# Patient Record
Sex: FEMALE | URBAN - METROPOLITAN AREA
[De-identification: names, ages, dates, MRNs, and addresses within clinical notes are randomized per-mention and may not be internally consistent; named-entity substitution may affect disease eponyms.]

---

## 2022-05-05 ENCOUNTER — OFFICE VISIT (OUTPATIENT)
Dept: URBAN - METROPOLITAN AREA CLINIC 44 | Facility: CLINIC | Age: 82
End: 2022-05-05
Payer: COMMERCIAL

## 2022-05-05 DIAGNOSIS — H40.1122 PRIMARY OPEN-ANGLE GLAUCOMA, MODERATE STAGE, LEFT EYE: Primary | ICD-10-CM

## 2022-05-05 DIAGNOSIS — H25.11 AGE-RELATED NUCLEAR CATARACT, RIGHT EYE: ICD-10-CM

## 2022-05-05 DIAGNOSIS — H17.12 CENTRAL CORNEAL OPACITY OF LEFT EYE: ICD-10-CM

## 2022-05-05 DIAGNOSIS — H53.002 UNSPECIFIED AMBLYOPIA, LEFT EYE: ICD-10-CM

## 2022-05-05 PROCEDURE — 92133 CPTRZD OPH DX IMG PST SGM ON: CPT | Performed by: OPTOMETRIST

## 2022-05-05 PROCEDURE — 99204 OFFICE O/P NEW MOD 45 MIN: CPT | Performed by: OPTOMETRIST

## 2022-05-05 PROCEDURE — 76514 ECHO EXAM OF EYE THICKNESS: CPT | Performed by: OPTOMETRIST

## 2022-05-05 PROCEDURE — 92134 CPTRZ OPH DX IMG PST SGM RTA: CPT | Performed by: OPTOMETRIST

## 2022-05-05 RX ORDER — LATANOPROST 50 UG/ML
0.005 % SOLUTION OPHTHALMIC
Qty: 5 | Refills: 4 | Status: ACTIVE
Start: 2022-05-05

## 2022-05-05 ASSESSMENT — KERATOMETRY
OD: 44.25
OS: 44.75

## 2022-05-05 ASSESSMENT — INTRAOCULAR PRESSURE
OD: 21
OS: 22

## 2022-05-05 ASSESSMENT — VISUAL ACUITY
OD: 20/30
OS: 20/60

## 2022-05-05 NOTE — IMPRESSION/PLAN
Impression: Primary open-angle glaucoma, moderate stage, left eye: H40.1122. Plan: Oct thinning, elevated iop, thick pachs -3mm 2nd to cornea edema.  Start [employer],

## 2022-05-05 NOTE — IMPRESSION/PLAN
Impression: Age-related nuclear cataract, right eye: H25.11. Plan: Patient prefers no treatment at this time. Patient will monitor vision changes and contact us with any decrease in vision, will re-evaluate cataract on return visit.

## 2022-07-14 ENCOUNTER — OFFICE VISIT (OUTPATIENT)
Dept: URBAN - METROPOLITAN AREA CLINIC 44 | Facility: CLINIC | Age: 82
End: 2022-07-14
Payer: COMMERCIAL

## 2022-07-14 DIAGNOSIS — H40.1122 PRIMARY OPEN-ANGLE GLAUCOMA, MODERATE STAGE, LEFT EYE: Primary | ICD-10-CM

## 2022-07-14 PROCEDURE — 92012 INTRM OPH EXAM EST PATIENT: CPT | Performed by: OPTOMETRIST

## 2022-07-14 PROCEDURE — 92083 EXTENDED VISUAL FIELD XM: CPT | Performed by: OPTOMETRIST

## 2022-07-14 ASSESSMENT — INTRAOCULAR PRESSURE
OD: 14
OS: 16

## 2022-07-14 NOTE — IMPRESSION/PLAN
Impression: Primary open-angle glaucoma, moderate stage, left eye: H40.1122. Plan: Good IOP decrease; thick pachs -3mm 2nd to cornea edema.  Continue [facility]